# Patient Record
Sex: FEMALE | Race: WHITE | NOT HISPANIC OR LATINO | Employment: FULL TIME | URBAN - METROPOLITAN AREA
[De-identification: names, ages, dates, MRNs, and addresses within clinical notes are randomized per-mention and may not be internally consistent; named-entity substitution may affect disease eponyms.]

---

## 2024-06-03 ENCOUNTER — OFFICE VISIT (OUTPATIENT)
Dept: OBSTETRICS AND GYNECOLOGY | Facility: CLINIC | Age: 39
End: 2024-06-03
Payer: COMMERCIAL

## 2024-06-03 VITALS
SYSTOLIC BLOOD PRESSURE: 110 MMHG | DIASTOLIC BLOOD PRESSURE: 60 MMHG | BODY MASS INDEX: 25.32 KG/M2 | WEIGHT: 147.49 LBS

## 2024-06-03 DIAGNOSIS — Z01.419 ENCOUNTER FOR WELL WOMAN EXAM WITH ROUTINE GYNECOLOGICAL EXAM: Primary | ICD-10-CM

## 2024-06-03 DIAGNOSIS — Z12.31 ENCOUNTER FOR SCREENING MAMMOGRAM FOR MALIGNANT NEOPLASM OF BREAST: ICD-10-CM

## 2024-06-03 DIAGNOSIS — F32.A ANXIETY AND DEPRESSION: ICD-10-CM

## 2024-06-03 DIAGNOSIS — F41.9 ANXIETY AND DEPRESSION: ICD-10-CM

## 2024-06-03 PROCEDURE — 99395 PREV VISIT EST AGE 18-39: CPT | Performed by: NURSE PRACTITIONER

## 2024-06-03 PROCEDURE — 1036F TOBACCO NON-USER: CPT | Performed by: NURSE PRACTITIONER

## 2024-06-03 RX ORDER — SERTRALINE HYDROCHLORIDE 50 MG/1
50 TABLET, FILM COATED ORAL DAILY
Qty: 30 TABLET | Refills: 0 | Status: SHIPPED | OUTPATIENT
Start: 2024-06-03 | End: 2024-07-03

## 2024-06-03 NOTE — PROGRESS NOTES
HPI:   Lucia Swann is a 38 y.o. who presents today for her annual gynecologic exam with complaints    She has the following concerns; having worsening anxiety. Had felt it just the first couple of days before her period, now is feeling it more often. She would like to start meds. She is also interested in counseling.     GYN HISTORY:  Periods are regular every 28-30 days, lasting 5 days.   Dysmenorrhea:mild, occurring first 1-2 days of flow. Cyclic symptoms include anxiety.   No intermenstrual bleeding, spotting, or discharge.    Current contraception: vasectomy      Requests STD testing: no     PAP History   Last pap:   2021 Normal HPV Negative  History of abnormal pap: no  HPV vaccine: no  @paphx@    Health Screening  Family history of breast, uterine, ovarian or colon cancer: no   Breast cancer: mammogram - needs an order.   Colon cancer: due at age 44       The patient feels safe at home.         Review of Systems:   Constitutional: no fever and no chills.  Cardiovascular: no chest pain.   Respiratory: no shortness of breath.   Gastrointestinal: no nausea, no abdominal pain and no constipation  Genitourinary: no dysuria, no urinary incontinence, no vaginal dryness, no pelvic pain and no vaginal discharge.   Neurological: no headache.  Psychiatric: no anxiety and no depression.              Objective         /60   Wt 66.9 kg (147 lb 7.8 oz)   LMP 05/18/2024 (Exact Date)   BMI 25.32 kg/m²         Physical Exam:   Constitutional: Alert and in no acute distress. Well developed, well nourished.      Neck: No neck asymmetry. Supple. Thyroid not enlarged and there were no palpable thyroid nodules.      Cardiovascular: Heart rate and rhythm were normal, normal S1 and S2, no gallops, and no murmurs.      Pulmonary: No respiratory distress. Clear bilateral breath sounds.      Chest: Breasts: Normal appearance, no nipple discharge and no skin changes. Palpation of breasts and axillae: No palpable mass  and no axillary lymphadenopathy.      Abdomen: Soft nontender; no abdominal mass palpated. Normal bowel sounds. No organomegaly.      Genitourinary:   - External genitalia: Normal.   - Palpation of lymph nodes in groin: No inguinal lymphadenopathy.   - Bartholin's Urethral and Skenes Glands: Normal.   - Urethra: Normal.    -Bladder: Normal on palpation.   - Vagina: Normal.   - Cervix: Normal.   - Uterus: Normal. Right Adnexa/parametria: Normal. Left Adnexa/parametria: Normal.   - Perianal Area: Normal.      Skin: Normal skin color and pigmentation, normal skin turgor, and no rash     Psychiatric: Alert and oriented x 3. Affect normal to patient baseline. Mood: Appropriate.            Assessment/Plan       Diagnoses and all orders for this visit:  Encounter for well woman exam with routine gynecological exam  Here for well woman exam. She is doing well, though feeding more anxiety/ some depression for which she would like treated. She denies any thoughts of self harm. Discussed treatment with meds and counseling. She is agreeable to this plan. She will start Zoloft 50 mg (will break tablet in half the first 1-2 weeks, then increase to 50 mg). Reviewed risks/benefits/ side effects.   PAP is up to date. She will plan on a mammogram after she turns 39.   Anxiety and depression  -     sertraline (Zoloft) 50 mg tablet; Take 1 tablet (50 mg) by mouth once daily.  Encounter for screening mammogram for malignant neoplasm of breast  -     BI mammo bilateral screening tomosynthesis; Future  Follow-up in 2-4 weeks to re-evaluate symptoms.       Melanie Suggs, APRN-CNP

## 2024-06-03 NOTE — ASSESSMENT & PLAN NOTE
Here for well woman exam. She is doing well, though feeding more anxiety/ some depression for which she would like treated. She denies any thoughts of self harm. Discussed treatment with meds and counseling. She is agreeable to this plan. She will start Zoloft 50 mg (will break tablet in half the first 1-2 weeks, then increase to 50 mg). Reviewed risks/benefits/ side effects.   PAP is up to date. She will plan on a mammogram after she turns 39.

## 2024-06-25 ENCOUNTER — APPOINTMENT (OUTPATIENT)
Dept: OBSTETRICS AND GYNECOLOGY | Facility: CLINIC | Age: 39
End: 2024-06-25
Payer: COMMERCIAL

## 2024-06-25 DIAGNOSIS — F41.9 ANXIETY AND DEPRESSION: ICD-10-CM

## 2024-06-25 DIAGNOSIS — F32.A ANXIETY AND DEPRESSION: ICD-10-CM

## 2024-06-25 PROCEDURE — 99212 OFFICE O/P EST SF 10 MIN: CPT | Performed by: NURSE PRACTITIONER

## 2024-06-25 PROCEDURE — 1036F TOBACCO NON-USER: CPT | Performed by: NURSE PRACTITIONER

## 2024-06-25 RX ORDER — SERTRALINE HYDROCHLORIDE 50 MG/1
50 TABLET, FILM COATED ORAL DAILY
Qty: 90 TABLET | Refills: 3 | Status: SHIPPED | OUTPATIENT
Start: 2024-06-25 | End: 2025-06-25

## 2024-06-25 ASSESSMENT — ENCOUNTER SYMPTOMS
IRRITABILITY: 1
NERVOUS/ANXIOUS: 1

## 2024-06-25 NOTE — PROGRESS NOTES
Subjective   Patient ID: Lucia Swann is a 38 y.o. female who presents for Follow-up (Medication- Sertraline (Zoloft) 50 mg tablet 0.5 tablet qd x few weeks, then 1 qd/- virtual visit-/Reviewing  EMR/Last Annual Exam: 06/03/2024/Negative Pap / Negative HPV  2021/).  Anxiety  Presents for follow-up visit. Symptoms include irritability and nervous/anxious behavior. Symptoms occur most days. The severity of symptoms is mild. The quality of sleep is good. Nighttime awakenings: none.     Compliance with medications is %.       Review of Systems   Constitutional:  Positive for irritability.   Psychiatric/Behavioral:  The patient is nervous/anxious.        Objective   Physical Exam  Constitutional:       Appearance: Normal appearance.   Pulmonary:      Effort: Pulmonary effort is normal.   Neurological:      Mental Status: She is alert.   Psychiatric:         Mood and Affect: Mood normal.         Behavior: Behavior normal.         Assessment/Plan   Diagnoses and all orders for this visit:  Anxiety and depression  -     sertraline (Zoloft) 50 mg tablet; Take 1 tablet (50 mg) by mouth once daily.  Doing well on zoloft. Feels symptoms are improved. Will continue with 50 mg daily. Will follow-up in office as needed and annually for well woman exam.        PREMA Peña-CNP 06/25/24 12:00 PM

## 2024-10-14 ENCOUNTER — HOSPITAL ENCOUNTER (OUTPATIENT)
Dept: RADIOLOGY | Facility: CLINIC | Age: 39
Discharge: HOME | End: 2024-10-14
Payer: COMMERCIAL

## 2024-10-14 VITALS — WEIGHT: 147.49 LBS | BODY MASS INDEX: 25.18 KG/M2 | HEIGHT: 64 IN

## 2024-10-14 DIAGNOSIS — Z12.31 ENCOUNTER FOR SCREENING MAMMOGRAM FOR MALIGNANT NEOPLASM OF BREAST: ICD-10-CM

## 2024-10-14 PROCEDURE — 77067 SCR MAMMO BI INCL CAD: CPT

## 2024-10-14 PROCEDURE — 77063 BREAST TOMOSYNTHESIS BI: CPT | Performed by: RADIOLOGY

## 2024-10-14 PROCEDURE — 77067 SCR MAMMO BI INCL CAD: CPT | Performed by: RADIOLOGY

## 2024-11-16 ENCOUNTER — TELEMEDICINE (OUTPATIENT)
Dept: PRIMARY CARE | Facility: CLINIC | Age: 39
End: 2024-11-16
Payer: COMMERCIAL

## 2024-11-16 DIAGNOSIS — J30.2 SEASONAL ALLERGIC RHINITIS, UNSPECIFIED TRIGGER: Primary | ICD-10-CM

## 2024-11-16 PROCEDURE — 99213 OFFICE O/P EST LOW 20 MIN: CPT | Performed by: NURSE PRACTITIONER

## 2024-11-16 ASSESSMENT — ENCOUNTER SYMPTOMS
HEADACHES: 1
COUGH: 0
SINUS PRESSURE: 1
WHEEZING: 0
SORE THROAT: 0
LIGHT-HEADEDNESS: 0
MYALGIAS: 0
FEVER: 0
ACTIVITY CHANGE: 0
CHEST TIGHTNESS: 0
DIZZINESS: 0
APPETITE CHANGE: 0
TROUBLE SWALLOWING: 0
FATIGUE: 0
ARTHRALGIAS: 0

## 2024-11-16 ASSESSMENT — LIFESTYLE VARIABLES: HISTORY_OF_SMOKING: I HAVE NEVER SMOKED

## 2024-11-16 NOTE — PROGRESS NOTES
Subjective   Patient ID: Lucia Swann is a 39 y.o. female [facility]    [facility] and has been treated with antibiotics twice and symptoms  1st round with amoxicillin and second round with Augmentin  Symptoms include nasal congestion and PND, sinus pressure and HA.   Uses leonor pot   Takes loratadine and Flonase intermittently  Does not have a PCP          Review of Systems   Constitutional:  Negative for activity change, appetite change, fatigue and fever.   HENT:  Positive for congestion, postnasal drip, sinus pressure and sneezing. Negative for sore throat and trouble swallowing.    Respiratory:  Negative for cough, chest tightness and wheezing.    Cardiovascular:  Negative for chest pain.   Musculoskeletal:  Negative for arthralgias and myalgias.   Neurological:  Positive for headaches. Negative for dizziness and light-headedness.       Objective   There were no vitals taken for this visit.    Physical Exam  Constitutional:       General: She is not in acute distress.     Appearance: Normal appearance. She is not ill-appearing.      Comments: On Demand Virtual Visit Patient Consent     An interactive audio and video telecommunication system which permits real time communications between the patient (at the originating site) and provider (at the distant site) was utilized to provide this telehealth service.   Verbal consent was requested and obtained from Lucia Swann (or parent if under 18) on this date, 03/27/24 for a telehealth visit.   I have verbally confirmed with Lucia Swann (or parent if under 18) that they are physically located in the [facility] during this virtual visit.    I performed this visit using realtime telehealth tools, including an audio/video OR telephone connection between the patient listed who was located in the [facility] and myself, Arnaldo Bledsoe CNP (licensed in the [facility]).  At the start of the  visit, I introduced myself as Arnaldo Bledsoe, Nurse practitioner and verified the patients name, , and current physical location.    If they were currently outside of the state of [place], the visit was ended and the patient was referred to alternative means for evaluation and treatment.   The patient was made aware of the limitations of the telehealth visit.  They will not be physically examined and all issues may not be appropriate for a telehealth visit.  If necessary, an in person referral will be made.       DISCLAIMER:   In preparing for this visit and writing this note, I reviewed previous electronic medical records (labs, imaging and medical charts) available.  Significant findings which helped in decision making are recorded in this encounter charting.       Neurological:      Mental Status: She is alert and oriented to person, place, and time.         Assessment/Plan   Diagnoses and all orders for this visit:  Seasonal allergic rhinitis, unspecified trigger  -     Referral to Primary Care; Future     Advised to take Allegra or Zyrtec daily as well as Flonase nasal spray 1-2 sprays each nostril daily  May use saline nasal spray as needed.   Follow up with PCP as needed  [facility]

## 2024-11-16 NOTE — PATIENT INSTRUCTIONS
Pleasure meeting with you today!    Let me know if you need anything.     Please send me a MyChart message if you have any questions or concerns.  FOR NON URGENT questions only.  Allow up to 72 hours for response.     If you have prescription issues or other questions you can email   Horace James,  Digital Health Coordinator, at   [email or web address]

## 2025-02-19 ENCOUNTER — APPOINTMENT (OUTPATIENT)
Dept: PRIMARY CARE | Facility: CLINIC | Age: 40
End: 2025-02-19
Payer: COMMERCIAL

## 2025-03-14 ENCOUNTER — APPOINTMENT (OUTPATIENT)
Dept: PRIMARY CARE | Facility: CLINIC | Age: 40
End: 2025-03-14
Payer: COMMERCIAL

## 2025-03-14 VITALS
SYSTOLIC BLOOD PRESSURE: 116 MMHG | HEART RATE: 87 BPM | BODY MASS INDEX: 25.1 KG/M2 | HEIGHT: 64 IN | OXYGEN SATURATION: 99 % | DIASTOLIC BLOOD PRESSURE: 68 MMHG | WEIGHT: 147 LBS

## 2025-03-14 DIAGNOSIS — J31.0 CHRONIC RHINITIS: ICD-10-CM

## 2025-03-14 DIAGNOSIS — Z00.00 ENCOUNTER FOR PREVENTIVE HEALTH EXAMINATION: Primary | ICD-10-CM

## 2025-03-14 DIAGNOSIS — Z11.59 ENCOUNTER FOR HEPATITIS C SCREENING TEST FOR LOW RISK PATIENT: ICD-10-CM

## 2025-03-14 RX ORDER — AZELASTINE 1 MG/ML
1 SPRAY, METERED NASAL 2 TIMES DAILY
Qty: 30 ML | Refills: 12 | Status: SHIPPED | OUTPATIENT
Start: 2025-03-14 | End: 2026-03-14

## 2025-03-14 RX ORDER — FLUTICASONE PROPIONATE 50 MCG
1 SPRAY, SUSPENSION (ML) NASAL DAILY
Qty: 16 G | Refills: 11 | Status: SHIPPED | OUTPATIENT
Start: 2025-03-14 | End: 2026-03-14

## 2025-03-14 ASSESSMENT — PATIENT HEALTH QUESTIONNAIRE - PHQ9
1. LITTLE INTEREST OR PLEASURE IN DOING THINGS: NOT AT ALL
2. FEELING DOWN, DEPRESSED OR HOPELESS: NOT AT ALL
SUM OF ALL RESPONSES TO PHQ9 QUESTIONS 1 AND 2: 0

## 2025-03-14 NOTE — PROGRESS NOTES
Subjective   Patient ID: Lucia Swann is a 39 y.o. female who presents for New Patient Visit, Annual Exam, and Weight Gain.    HPI  Diet is well balanced. Too many sweets.  Exercises regularly.  3-4x/week - walking and weights.  Not yet indicated. No family history of colon cancer.  Mammogram up to date, repeat annually.  PAP is up to date. Follows with OB/GYN.  Vaccines are up to date.  Dental exam is up to date.  Vision exam is not up to date.  Patient is not fasting for labs today.   Social: Tobacco use-  No     Alcohol use- Socially.    Other concerns addressed today include:   Weight gain  Lucia has felt that she has gained weight over the last year. Has felt the ability to lose the weight is not the same as when she was younger, despite working hard at it. Would like to talk about medication for weight gain.    2. Chronic rhinitis  Every fall she gets a sinus infection, and then it would usually go away once the temperature becomes colder/freezing. She has had this consistently for the entire fall/winter and now into spring. She has postnasal drip constantly, congestion in the nose and face, and sometimes in the ears. She will sometimes get a pressure headache from this. To alleviate the congestion she tried zyrtec, flonase, astelin, neti pot flushing, pseudophed. However she did not try the flonase and astelin sprays for long enough. These would work temporarily for her, but never clear it up fully. She has not noticed anything that exacerbates it and worsens the symptoms.     3. Anxiety and depression  Takes sertraline 50 mg daily. Denies any adverse effects and tolerates the medication well.     Review of Systems  All pertinent positive symptoms are included in the history of present illness.    All other systems have been reviewed and are negative and noncontributory to this patient's current ailments.     Social History     Tobacco Use    Smoking status: Never    Smokeless tobacco: Never  "  Substance Use Topics    Alcohol use: Yes     Alcohol/week: 2.0 standard drinks of alcohol     Types: 2 Standard drinks or equivalent per week      Past Surgical History:   Procedure Laterality Date    OTHER SURGICAL HISTORY  01/15/2020    No history of surgery      No Known Allergies     Current Outpatient Medications   Medication Sig Dispense Refill    sertraline (Zoloft) 50 mg tablet Take 1 tablet (50 mg) by mouth once daily. 90 tablet 3    azelastine (Astelin) 137 mcg (0.1 %) nasal spray Administer 1 spray into each nostril 2 times a day. Use in each nostril as directed 30 mL 12    fluticasone (Flonase) 50 mcg/actuation nasal spray Administer 1 spray into each nostril once daily. Shake gently. Before first use, prime pump. After use, clean tip and replace cap. 16 g 11     No current facility-administered medications for this visit.        Patient Active Problem List   Diagnosis    Encounter for well woman exam with routine gynecological exam    Anxiety and depression      Immunization History   Administered Date(s) Administered    COVID-19, mRNA, LNP-S, PF, 30 mcg/0.3 mL dose 04/02/2021, 04/28/2021, 12/15/2021    Pfizer COVID-19 vaccine, bivalent, age 12 years and older (30 mcg/0.3 mL) 10/20/2022       Objective   VITALS  /68   Pulse 87   Ht 1.626 m (5' 4\")   Wt 66.7 kg (147 lb)   LMP 02/19/2025   SpO2 99%   BMI 25.23 kg/m²      Physical Exam  CONSTITUTIONAL - well nourished, well developed, looks like stated age, in no acute distress, not ill-appearing  SKIN - normal skin color and pigmentation, normal skin turgor without rash, lesions, or nodules visualized  HEAD - no trauma, normocephalic  EYES - extraocular muscles are intact, and normal external exam  ENT - TM's intact, no injection, no signs of infection, uvula midline, normal tongue movement and throat normal, no exudate  NECK - supple without rigidity, no neck mass was observed, no thyromegaly or thyroid nodules  CHEST - clear to " auscultation, no wheezing, no crackles and no rales, good effort  CARDIAC - regular rate and regular rhythm, no skipped beats, no murmur  ABDOMEN - no organomegaly, soft, nontender, nondistended, normal bowel sounds, no guarding/rebound/rigidity  EXTREMITIES - no edema, no deformities  NEUROLOGICAL - normal gait, normal balance, normal motor, no ataxia, DTRs equal and symmetrical; alert, oriented and no focal signs  PSYCHIATRIC - alert, pleasant and cordial, age-appropriate  IMMUNOLOGICAL - bilaterally enlarged submandibular lymph nodes, no further cervical LN enlargement.     Assessment/Plan   Diagnoses and all orders for this visit:  Encounter for preventive health examination  A complete history and physical was performed today.  Vaccines are up to date.  Mammogram is up to date.  PAP is up to date.  Fasting labs ordered today include:  -     CBC; Future  -     Comprehensive Metabolic Panel; Future  -     Hemoglobin A1C; Future  -     Lipid Panel; Future  -     TSH with reflex to Free T4 if abnormal; Future  Encounter for hepatitis C screening test for low risk patient  -     Hepatitis C Antibody; Future  Chronic rhinitis  Recommend Flonase and azelastine for one month. If not improved, recommend ENT for further evaluation  -     Respiratory Allergy Profile IgE; Future  -     fluticasone (Flonase) 50 mcg/actuation nasal spray; Administer 1 spray into each nostril once daily. Shake gently. Before first use, prime pump. After use, clean tip and replace cap.  -     azelastine (Astelin) 137 mcg (0.1 %) nasal spray; Administer 1 spray into each nostril 2 times a day. Use in each nostril as directed    Return in one year and sooner as needed

## 2025-03-22 LAB
A ALTERNATA IGE QN: <0.1 KU/L
A ALTERNATA IGE RAST: 0
A FUMIGATUS IGE QN: <0.1 KU/L
A FUMIGATUS IGE RAST: 0
ALBUMIN SERPL-MCNC: 4.5 G/DL (ref 3.6–5.1)
ALP SERPL-CCNC: 52 U/L (ref 31–125)
ALT SERPL-CCNC: 15 U/L (ref 6–29)
ANION GAP SERPL CALCULATED.4IONS-SCNC: 12 MMOL/L (CALC) (ref 7–17)
AST SERPL-CCNC: 18 U/L (ref 10–30)
BERMUDA GRASS IGE QN: <0.1 KU/L
BERMUDA GRASS IGE RAST: 0
BILIRUB SERPL-MCNC: 0.6 MG/DL (ref 0.2–1.2)
BOXELDER IGE QN: <0.1 KU/L
BOXELDER IGE RAST: 0
BUN SERPL-MCNC: 13 MG/DL (ref 7–25)
C HERBARUM IGE QN: <0.1 KU/L
C HERBARUM IGE RAST: 0
CALCIUM SERPL-MCNC: 8.9 MG/DL (ref 8.6–10.2)
CALIF WALNUT POLN IGE QN: <0.1 KU/L
CALIF WALNUT POLN IGE RAST: 0
CAT DANDER IGE QN: <0.1 KU/L
CAT DANDER IGE RAST: 0
CHLORIDE SERPL-SCNC: 102 MMOL/L (ref 98–110)
CHOLEST SERPL-MCNC: 172 MG/DL
CHOLEST/HDLC SERPL: 3.4 (CALC)
CMN PIGWEED IGE QN: <0.1 KU/L
CMN PIGWEED IGE RAST: 0
CO2 SERPL-SCNC: 21 MMOL/L (ref 20–32)
COMMON RAGWEED IGE QN: <0.1 KU/L
COMMON RAGWEED IGE RAST: 0
COTTONWOOD IGE QN: <0.1 KU/L
COTTONWOOD IGE RAST: 0
CREAT SERPL-MCNC: 0.6 MG/DL (ref 0.5–0.99)
D FARINAE IGE QN: <0.1 KU/L
D FARINAE IGE RAST: 0
D PTERONYSS IGE QN: <0.1 KU/L
D PTERONYSS IGE RAST: 0
DOG DANDER IGE QN: <0.1 KU/L
DOG DANDER IGE RAST: 0
EGFRCR SERPLBLD CKD-EPI 2021: 116 ML/MIN/1.73M2
ERYTHROCYTE [DISTWIDTH] IN BLOOD BY AUTOMATED COUNT: 11.8 % (ref 11–15)
EST. AVERAGE GLUCOSE BLD GHB EST-MCNC: 94 MG/DL
EST. AVERAGE GLUCOSE BLD GHB EST-SCNC: 5.2 MMOL/L
GLUCOSE SERPL-MCNC: 74 MG/DL (ref 65–99)
HBA1C MFR BLD: 4.9 % OF TOTAL HGB
HCT VFR BLD AUTO: 40 % (ref 35–45)
HCV AB SERPL QL IA: NORMAL
HDLC SERPL-MCNC: 50 MG/DL
HGB BLD-MCNC: 12.9 G/DL (ref 11.7–15.5)
IGE SERPL-ACNC: 23 KU/L
LDLC SERPL CALC-MCNC: 108 MG/DL (CALC)
LONDON PLANE IGE QN: <0.1 KU/L
LONDON PLANE IGE RAST: 0
MCH RBC QN AUTO: 29.9 PG (ref 27–33)
MCHC RBC AUTO-ENTMCNC: 32.3 G/DL (ref 32–36)
MCV RBC AUTO: 92.8 FL (ref 80–100)
MOUSE URINE PROT IGE QN: <0.1 KU/L
MOUSE URINE PROT IGE RAST: 0
MT JUNIPER IGE QN: <0.1 KU/L
MT JUNIPER IGE RAST: 0
NONHDLC SERPL-MCNC: 122 MG/DL (CALC)
P NOTATUM IGE QN: <0.1 KU/L
P NOTATUM IGE RAST: 0
PECAN/HICK TREE IGE QN: <0.1 KU/L
PECAN/HICK TREE IGE RAST: 0
PLATELET # BLD AUTO: 210 THOUSAND/UL (ref 140–400)
PMV BLD REES-ECKER: 10.7 FL (ref 7.5–12.5)
POTASSIUM SERPL-SCNC: 4.7 MMOL/L (ref 3.5–5.3)
PROT SERPL-MCNC: 6.8 G/DL (ref 6.1–8.1)
RBC # BLD AUTO: 4.31 MILLION/UL (ref 3.8–5.1)
REF LAB TEST REF RANGE: NORMAL
ROACH IGE QN: <0.1 KU/L
ROACH IGE RAST: 0
SALTWORT IGE QN: <0.1 KU/L
SALTWORT IGE RAST: 0
SHEEP SORREL IGE QN: <0.1 KU/L
SHEEP SORREL IGE RAST: 0
SILVER BIRCH IGE QN: <0.1 KU/L
SILVER BIRCH IGE RAST: 0
SODIUM SERPL-SCNC: 135 MMOL/L (ref 135–146)
TIMOTHY IGE QN: <0.1 KU/L
TIMOTHY IGE RAST: 0
TRIGL SERPL-MCNC: 49 MG/DL
TSH SERPL-ACNC: 1.09 MIU/L
WBC # BLD AUTO: 5.9 THOUSAND/UL (ref 3.8–10.8)
WHITE ASH IGE QN: <0.1 KU/L
WHITE ASH IGE RAST: 0
WHITE ELM IGE QN: <0.1 KU/L
WHITE ELM IGE RAST: 0
WHITE MULBERRY IGE QN: <0.1 KU/L
WHITE MULBERRY IGE RAST: 0
WHITE OAK IGE QN: <0.1 KU/L
WHITE OAK IGE RAST: 0

## 2025-04-28 ENCOUNTER — PATIENT MESSAGE (OUTPATIENT)
Dept: PRIMARY CARE | Facility: CLINIC | Age: 40
End: 2025-04-28
Payer: COMMERCIAL

## 2025-04-28 DIAGNOSIS — J31.0 CHRONIC RHINITIS: Primary | ICD-10-CM

## 2025-05-20 ENCOUNTER — APPOINTMENT (OUTPATIENT)
Dept: OTOLARYNGOLOGY | Facility: CLINIC | Age: 40
End: 2025-05-20
Payer: COMMERCIAL

## 2025-05-20 VITALS — BODY MASS INDEX: 24.03 KG/M2 | WEIGHT: 140 LBS

## 2025-05-20 DIAGNOSIS — J32.9 CHRONIC SINUSITIS, UNSPECIFIED LOCATION: ICD-10-CM

## 2025-05-20 DIAGNOSIS — J31.0 CHRONIC RHINITIS: ICD-10-CM

## 2025-05-20 PROCEDURE — 99204 OFFICE O/P NEW MOD 45 MIN: CPT | Performed by: GENERAL PRACTICE

## 2025-05-20 PROCEDURE — G8433 SCR FOR DEP NOT CPT DOC RSN: HCPCS | Performed by: GENERAL PRACTICE

## 2025-05-20 RX ORDER — CLARITHROMYCIN 500 MG/1
500 TABLET, FILM COATED ORAL 2 TIMES DAILY
Qty: 28 TABLET | Refills: 0 | Status: SHIPPED | OUTPATIENT
Start: 2025-05-20 | End: 2025-06-03

## 2025-05-20 NOTE — PROGRESS NOTES
Otolaryngology - Head and Neck Surgery Outpatient New Patient Visit Note        Assessment/Plan:   Problem List Items Addressed This Visit    None  Visit Diagnoses         Codes      Chronic rhinitis     J31.0      Chronic sinusitis, unspecified location     J32.9    Relevant Medications    clarithromycin (Biaxin) 500 mg tablet    Other Relevant Orders    CT sinus wo IV contrast            39yoF with approx 1y of chronic congestion, sinus pressure, discolored rhionrrhea/PND despite use of flonase/astelin.   Will acquire treated CT sinus (biaxin) to aid in eval and treatment planning.     Continue controls for rhinitis and add saline irrigations.        Follow up:  -plan for follow up in clinic as needed, after completion of ordered studies, and in 1-2 months    All of the above findings, impressions, treatment planning and follow up plans were discussed with the patient who indicated understanding.  the patient was instructed to contact or return to clinic sooner if symptoms/signs persist or worsen despite the above management.      Richard Estrada MD  Otolaryngology - Head and Neck Surgery            History Of Present Illness  Lucia Swann is a 39 y.o. female presenting for evaluation of a history of sinus pressure/discomfort, congestion, thick/discolored PND/rhinorrhea and ETD symptoms.      Notes a history of acute sinusitis 1-2x per year requring antibiotics.  Have not resolved symptoms recently.     Notes some allergic rhinitis history,   Using flonase for approx 1y and astelin for several months with limited effect.     No prior nasal trauma/surgery.                 Past Medical History  She has a past medical history of Other conditions influencing health status.    Surgical History  She has a past surgical history that includes Other surgical history (01/15/2020).     Social History  She reports that she has never smoked. She has never used smokeless tobacco. She reports current alcohol use of about 2.0  standard drinks of alcohol per week. She reports that she does not use drugs.    Family History  Family History[1]     Allergies  Patient has no known allergies.    Review of Systems  ROS: Pertinent positives as noted in HPI.    - CONSTITUTIONAL: Does not report weight loss, fever or chills.    - HEENT:   Ear: Does not report tinnitus, vertigo, hearing loss, otalgia, otorrhea  Nose: Does not report  ,  , epistaxis, decreased smell  Throat: Does not report pain, dysphagia, odynophagia  Larynx: Does not report hoarseness,  difficulty breathing, pain with speaking (odynophonia)  Neck: Does not report new masses, pain, swelling  Face: Does not report    ,  , swelling, numbness, weakness     - RESPIRATORY: Does not report SOB or cough.    - CV: Does not report palpitations or chest pain.     - GI: Does not report abdominal pain, nausea, vomiting or diarrhea.    - : Does not report dysuria or urinary frequency.    - MSK: Does not report myalgia or joint pain.    - SKIN: Does not report rash or pruritus.    - NEUROLOGICAL: Does not report headache or syncope.    - PSYCHIATRIC: Does not report recent changes in mood. Does not report anxiety or depression.         Physical Exam:     GENERAL:   Alert & Oriented to person, place and time; Normal affect and appearance. Well developed and well nourished. Conversant & cooperative with examination.     HEAD:   Normocephalic, atraumatic. No sinus tenderness to palpation. Normal parotid bilaterally. Normal facial strength.     NEUROLOGIC:   Cranial nerves II-XII grossly intact, gait WNL. Normal mood and affect.    EYES:   Extraocular movements intact. Pupils equal, round, reactive to light and accommodation. No nystagmus, no ptosis. no scleral injection.    EAR:   Normal auricle. No discomfort or TTP with manipulation.   Handheld otoscopic exam showed normal external auditory canals bilaterally. No purulence or EAC inflammation. Minimal cerumen.   Right tympanic membrane clear and  mobile without evidence of perforation, retraction or middle ear effusion.   Left tympanic membrane clear and mobile without evidence of perforation, retraction or middle ear effusion.     NOSE:   No external deformity. No external nasal lesions, lacerations, or scars. Nasal tip symmetrical with normal nasal valves.   Nasal cavity with essentially midline septum, edematous  mucosa and turbinates. No lesions, masses, purulence or polyps.     OC/OP:   Mucous membranes moist, no masses, lesions or exudates.   Normal tongue, floor of mouth, teeth, gums, lips. Normal posterior pharyngeal wall.    Normal tonsils without erythema, exudate or obvious calculi     NECK:   No neck masses or thyroid enlargement. Trachea midline. No tenderness to palpation    LYMPHATIC:   No cervical lymphadenopathy.     RESPIRATORY:   Symmetric chest elevation & no retractions. No significant hoarseness. No increased work of breathing.    CV:   No clubbing or cyanosis. No obvious edema    Skin:   No facial rashes, vesicles or lesions.     Extremities:   No gross abnormalities      Clinic Procedure        Information review:  External sources (notes, imaging, lab results) listed below personally reviewed to aid in medical decision making process.  -PCM 3/14/25  -PCM 11/16/24  - 10/25/24         [1]   Family History  Problem Relation Name Age of Onset    No Known Problems Mother      Hypertension Father

## 2025-06-06 ENCOUNTER — HOSPITAL ENCOUNTER (OUTPATIENT)
Dept: RADIOLOGY | Facility: CLINIC | Age: 40
End: 2025-06-06
Payer: COMMERCIAL

## 2025-06-06 DIAGNOSIS — J32.9 CHRONIC SINUSITIS, UNSPECIFIED LOCATION: ICD-10-CM

## 2025-06-06 PROCEDURE — 70486 CT MAXILLOFACIAL W/O DYE: CPT

## 2025-06-09 ENCOUNTER — APPOINTMENT (OUTPATIENT)
Dept: OBSTETRICS AND GYNECOLOGY | Facility: CLINIC | Age: 40
End: 2025-06-09
Payer: COMMERCIAL

## 2025-07-23 ENCOUNTER — APPOINTMENT (OUTPATIENT)
Dept: OTOLARYNGOLOGY | Facility: CLINIC | Age: 40
End: 2025-07-23
Payer: COMMERCIAL

## 2025-07-23 VITALS — WEIGHT: 150.8 LBS | BODY MASS INDEX: 25.88 KG/M2

## 2025-07-23 DIAGNOSIS — J31.0 CHRONIC RHINITIS: Primary | ICD-10-CM

## 2025-07-23 DIAGNOSIS — J32.0 CHRONIC MAXILLARY SINUSITIS: ICD-10-CM

## 2025-07-23 PROCEDURE — 1036F TOBACCO NON-USER: CPT | Performed by: GENERAL PRACTICE

## 2025-07-23 PROCEDURE — 99214 OFFICE O/P EST MOD 30 MIN: CPT | Performed by: GENERAL PRACTICE

## 2025-07-23 RX ORDER — IPRATROPIUM BROMIDE 21 UG/1
2 SPRAY, METERED NASAL EVERY 12 HOURS
Qty: 30 ML | Refills: 3 | Status: SHIPPED | OUTPATIENT
Start: 2025-07-23 | End: 2026-07-23

## 2025-07-23 RX ORDER — TRIAMCINOLONE ACETONIDE 55 UG/1
2 SPRAY, METERED NASAL DAILY
Qty: 16.5 G | Refills: 3 | Status: SHIPPED | OUTPATIENT
Start: 2025-07-23 | End: 2026-07-23

## 2025-07-23 ASSESSMENT — PATIENT HEALTH QUESTIONNAIRE - PHQ9
1. LITTLE INTEREST OR PLEASURE IN DOING THINGS: NOT AT ALL
SUM OF ALL RESPONSES TO PHQ9 QUESTIONS 1 AND 2: 0
2. FEELING DOWN, DEPRESSED OR HOPELESS: NOT AT ALL

## 2025-07-23 NOTE — PROGRESS NOTES
Otolaryngology - Head and Neck Surgery Outpatient Established Patient Visit Note        Assessment/Plan:   Problem List Items Addressed This Visit    None  Visit Diagnoses         Codes      Chronic rhinitis    -  Primary J31.0    Relevant Medications    triamcinolone (Nasacort) 55 mcg nasal inhaler    ipratropium (Atrovent) 21 mcg (0.03 %) nasal spray      Chronic maxillary sinusitis     J32.0            39yoF with chronic congestion, waxing/waning sinus pressure and PND.   Mild chronic sinusitis and deviated septum on CT.   Discussed ongoing controls for rhinitis, trial nasacort and atrovent.     Discussed consideration of sinus procedure with possible septoturbinoplasty, limited FESS (b/l MMA) and possible clarifix and pt will consider.     Risks, benefits and indications of the procedure were discussed.  This included discussion of risks of pain, bleeding, infection, scarring with poor functional or cosmetic result, need for future procedures, or damage to surrounding structures to include regional blood vessels and nerves.  In particular, the risk of ongoing obstruction, ongoing rhinitis, CSF leak, vision change/loss was highlighted.  All questions were answered.  The patient/caregiver indicated understanding of the discussion and elected to consider      Follow up:  -plan for follow up in clinic as needed    All of the above findings, impressions, treatment planning and follow up plans were discussed with the patient who indicated understanding.  the patient was instructed to contact or return to clinic sooner if symptoms/signs persist or worsen despite the above management.      Richard Estrada MD  Otolaryngology - Head and Neck Surgery            History Of Present Illness  Lucia Swann is a 39 y.o. female presenting for follow up of treated CT sinus.    Notes essentially no change with ongoing congestion, waxing/waning max/midface pressure/discomfort and bothersome PND.      Flonase/astelin without  relief.     Denies reflux symptoms.      Recall  Lucia Swann is a 39 y.o. female presenting for evaluation of a history of sinus pressure/discomfort, congestion, thick/discolored PND/rhinorrhea and ETD symptoms.       Notes a history of acute sinusitis 1-2x per year requring antibiotics.  Have not resolved symptoms recently.      Notes some allergic rhinitis history,   Using flonase for approx 1y and astelin for several months with limited effect.      No prior nasal trauma/surgery.            Past Medical History  She has a past medical history of Dental disease and Other conditions influencing health status.    Surgical History  She has a past surgical history that includes Other surgical history (01/15/2020).     Social History  She reports that she has never smoked. She has never used smokeless tobacco. She reports current alcohol use of about 2.0 standard drinks of alcohol per week. She reports that she does not use drugs.    Family History  Family History[1]     Allergies  Patient has no known allergies.    Review of Systems  ROS: Pertinent positives as noted in HPI.    - CONSTITUTIONAL: Does not report weight loss, fever or chills.    - HEENT:   Ear: Does not report tinnitus, vertigo, hearing loss, otalgia, otorrhea  Nose: Does not report  ,  , epistaxis, decreased smell  Throat: Does not report pain, dysphagia, odynophagia  Larynx: Does not report hoarseness,  difficulty breathing, pain with speaking (odynophonia)  Neck: Does not report new masses, pain, swelling  Face: Does not report    ,  , swelling, numbness, weakness     - RESPIRATORY: Does not report SOB or cough.    - CV: Does not report palpitations or chest pain.     - GI: Does not report abdominal pain, nausea, vomiting or diarrhea.    - : Does not report dysuria or urinary frequency.    - MSK: Does not report myalgia or joint pain.    - SKIN: Does not report rash or pruritus.    - NEUROLOGICAL: Does not report headache or syncope.    -  PSYCHIATRIC: Does not report recent changes in mood. Does not report anxiety or depression.         Physical Exam:     GENERAL:   Alert & Oriented to person, place and time; Normal affect and appearance. Well developed and well nourished. Conversant & cooperative with examination.     HEAD:   Normocephalic, atraumatic. No sinus tenderness to palpation. Normal parotid bilaterally. Normal facial strength.     NEUROLOGIC:   Cranial nerves II-XII grossly intact, gait WNL. Normal mood and affect.    EYES:   Extraocular movements intact. Pupils equal, round, reactive to light and accommodation. No nystagmus, no ptosis. no scleral injection.    EAR:   Normal auricle. No discomfort or TTP with manipulation.   Handheld otoscopic exam showed normal external auditory canals bilaterally. No purulence or EAC inflammation. Minimal cerumen.   Right tympanic membrane clear and mobile without evidence of perforation, retraction or middle ear effusion.   Left tympanic membrane clear and mobile without evidence of perforation, retraction or middle ear effusion.     NOSE:   No external deformity. No external nasal lesions, lacerations, or scars. Nasal tip symmetrical with normal nasal valves.   Nasal cavity with rightward deviated   septum, edematous mucosa and turbinates. No lesions, masses, purulence or polyps.     OC/OP:   Mucous membranes moist, no masses, lesions or exudates.   Normal tongue, floor of mouth, teeth, gums, lips. Normal posterior pharyngeal wall.    Normal tonsils without erythema, exudate or obvious calculi     NECK:   No neck masses or thyroid enlargement. Trachea midline. No tenderness to palpation    LYMPHATIC:   No cervical lymphadenopathy.     RESPIRATORY:   Symmetric chest elevation & no retractions. No significant hoarseness. No increased work of breathing.    CV:   No clubbing or cyanosis. No obvious edema    Skin:   No facial rashes, vesicles or lesions.     Extremities:   No gross abnormalities      Clinic  Procedure        Information review:  External sources (notes, imaging, lab results) listed below personally reviewed to aid in medical decision making process.  -  -  -         [1]   Family History  Problem Relation Name Age of Onset    No Known Problems Mother      Hypertension Father      Depression Maternal Grandmother Lisa Banksvis     Breast cancer Maternal Grandmother Lisa Iyer     Heart disease Maternal Grandmother Lisa Iyer     COPD Maternal Grandfather Job Banksvis     Cancer Maternal Grandfather Job Iyer     Diabetes Maternal Grandfather Job Iyer     Heart disease Maternal Grandfather Job Iyer     Diabetes Mother's Brother Type 2     Diabetes Mother's Sister type 2     Diabetes Paternal Grandfather David Cardona     Stroke Paternal Grandfather aDvid Cardona     Hearing loss Paternal Grandfather David Cardona     Hearing loss Paternal Grandmother Michelle Cardona

## 2025-09-29 ENCOUNTER — APPOINTMENT (OUTPATIENT)
Dept: OBSTETRICS AND GYNECOLOGY | Facility: CLINIC | Age: 40
End: 2025-09-29
Payer: COMMERCIAL